# Patient Record
Sex: FEMALE | Race: WHITE | Employment: FULL TIME | URBAN - NONMETROPOLITAN AREA
[De-identification: names, ages, dates, MRNs, and addresses within clinical notes are randomized per-mention and may not be internally consistent; named-entity substitution may affect disease eponyms.]

---

## 2021-06-06 NOTE — PROGRESS NOTES
[facility] and [address] initial consultation note    [facility]                                                Chief complaint: [facility] is a 77 y. o.oldfemale came for further evaluation regarding her sleep apnea  with referral from Ms. Carlie Hale CNP    [place]:    Sleep/Wake schedule:  Usual time to go to bed during the work/regular day of week: 8:30 PM.  Usual time to wake up during the work//regular day of week: 5:30 AM.  Over the weekends her sleep schedule: [x] Remain same. She usually falls a sleep in less than: 5 minutes. She takes naps: No.       Sleep Hygiene:    Is the temperature and evironment in her bed room is acceptable to her: Yes. She watches Television in her bed room: Yes. She read books, study, pay bills etc in the bed: No.  Frequency She wake up during night/sleep: 2  Majority of nocturnal awakenings are for urination: Yes. Difficulty in falling back to sleep after nocturnal awakenings: No    Do you drink coffee: Yes. 3 cup/s per day. She drinks 3 cups of coffee in the morning  Do you drink caffeinated beverages i.e sodas: Yes. 2 can/s per day. Do you drink tea: No.      Do you drink alcoholic beverages: No.    History of recreational drug use: No.     History of tobacco smoking:No.      Sleep apnea symptoms:  She was diagnosed with obstructive sleep apnea more than 10 years back.   History of headaches in the morning:No.  History of dry mouth in the morning: No.  History of palpitations during night time/nocturnal awakenings: No.  History of sweating during night time/nocturnal awakenings: No    General:  History of head injury in the past: She fell down on her back and hit her head more than 3years back  Associated loss of consciousness with head injury: No.  History of seizures: NO.  Rest less legs syndrome symptoms:NO  History suggestive of periodic limb movements during sleep: NO  History suggestive of hypnagogic hallucinations: NO  History suggestive of hypnopompic hallucinations: NO  History suggestive of sleep talking: NO  History suggestive of sleep walking:NO  History suggestive of bruxism: NO      History suggestive of cataplexy: NO  History suggestive of sleep paralysis:NO    Family history of sleep disorders:  Family history of obstructive sleep apnea: NO.  Family history of Narcolepsy: NO.  Family history of Rest less legs syndrome : NO.      History regarding old sleep studies:  Prior history of sleep study: Yes. Please see the diagnostic data section for details-no old records were available for me to review    Using CPAP device: Yes. Currently using home Oxygen: NO.       Patient considerations:  Is the patient is ambulatory: Yes  Patient is currently using: None of these Wheelchair, [employer] or [address] [facility] Para/Quadriplegic: NO  Hearing deficit : NO  Claustrophobic: NO  MDD : NO  Blind: NO  Incontinent: NO  Para/Quadraplegi: NO.   Need transportation to and from Sleep Center:NO    Social History:  Social History     Tobacco Use    Smoking status: Never Smoker    Smokeless tobacco: Never Used   Substance Use Topics    Alcohol use: Not Currently    Drug use: Never   . She is currently working: Yes.   She is currently working at the [employer] as a   She usually goes to her work at:  7:30AM.   She completes her work at:  4:00PM.                        Past Medical History:   Diagnosis Date    Anxiety     Deep vein blood clot of left lower extremity ([address]     Diabetes mellitus [address] [address]     Hyperlipemia     Hypertension        Past Surgical History:   Procedure Laterality Date    CARPAL TUNNEL RELEASE      HYSTEROSCOPY         Allergies   Allergen Reactions    Adhesive Tape     Keflex [Cephalexin]        Current Outpatient Medications   Medication Sig Dispense Refill    Insulin Glargine (LANTUS SC) Inject into the skin 46 units bid      SITagliptin (JANUVIA) 50 MG tablet Take 50 mg by mouth daily      empagliflozin (JARDIANCE) 25 MG tablet Take 25 mg by mouth daily      atorvastatin (LIPITOR) 80 MG tablet Take 100 mg by mouth daily      triamterene-hydroCHLOROthiazide (DYAZIDE) 37.5-25 MG per capsule Take 1 capsule by mouth every morning 2 tablets daily      calcium-vitamin D (OSCAL) 250-125 MG-UNIT per tablet Take 1 tablet by mouth daily      sertraline (ZOLOFT) 50 MG tablet Take 50 mg by mouth daily      warfarin (COUMADIN) 7.5 MG tablet Take 7.5 mg by mouth 7.5 mon-fri and 5mg rest of the days       No current facility-administered medications for this visit. No family history on file. Review of Systems:    General/Constitutional: No recent loss of weight or appetite changes. No fever or chills. HENT: Negative. Eyes: Negative. Upper respiratory tract: No nasal stuffiness or post nasal drip. Lower respiratory tract/ lungs: No cough or sputum production. No hemoptysis. Cardiovascular: No palpitations or chest pain. Gastrointestinal: No nausea or vomiting. Neurological: No focal neurologiacal weakness. Extremities: No edema. Musculoskeletal: No complaints. Genitourinary: No complaints. Hematological: Negative. Psychiatric/Behavioral: Negative. Skin: No itching. /72 (Site: Left Lower Arm, Position: Sitting, Cuff Size: Medium Adult)   Pulse 94   Temp 97.9 °F (36.6 °C)   Ht 5' 4\" (1.626 m)   Wt 275 lb (124.7 kg)   SpO2 98% Comment: room air at rest  BMI 47.20 kg/m²   Mallampati airway Class:4  Neck Circumference:.19 Inches  [place] sleepiness score 7/1/21:7  ( On further questioning she gives a history of hypersomnia ( Excessive daytime sleepiness) with daytime sleepy attacks especially during her lunchtime. No reported motor vehicle accidents due to her sleepiness). Sleep Apnea Quality Of Life Questionnaire:65. Physical Exam   Nursing note and vitals reviewed. Constitutional: Patient appears moderately built and moderately nourished.  No distress. Patient is oriented to person, place, and time. HENT:   Head: Normocephalic and atraumatic. Right Ear: External ear normal.   Left Ear: External ear normal.   Mouth/Throat: Oropharynx is clear and moist.  No oral thrush. Eyes: Conjunctivae are normal. Pupils are equal, round, and reactive to light. No scleral icterus. Neck: Neck supple. No JVD present. No tracheal deviation present. Cardiovascular: Normal rate, regular rhythm, normal heart sounds. No murmur heard. Pulmonary/Chest: Effort normal and breath sounds normal. No stridor. No respiratory distress. No wheezes. No rales. Patient exhibits no tenderness. Abdominal: Soft. Patient exhibits no distension. No tenderness. Musculoskeletal: Normal range of motion. Extremities: Patient exhibits no edema and no tenderness. Lymphadenopathy:  No cervical adenopathy. Neurological: Patient is alert and oriented to person, place, and time. Skin: Skin is warm and dry. Patient is not diaphoretic. Psychiatric: Patient  has a normal mood and affect. Patient behavior is normal.     Diagnostic Data:      Sleep test:  Patient did not remember the sleep lab name. No old records were available    CPAP test:  Patient did not remember the sleep lab name. No old records were available    Assessment:  -She was diagnosed with obstructive sleep apnea more than 10 years back by a sleep facility in [place]. Patient did not remember the name of the sleep facility and no old records were available. Patient used to be on treatment with a CPAP device. Her CPAP machine became old and obsolete. It not able to give any recent download. The CPAP machine need to be replaced with a new one  -Inadequate sleep hygiene.  -Essential hypertension on treatment with medications under control  -Type 2 diabetes mellitus on treatment with medications. -Depression on treatment with medications.  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   She is on download. -She was advised to call [employer] regarding supplies if needed.  -She was advised to practice good sleep hygiene techniques. She was provided with a hand out. -She was advised call my office for earlier appointment if needed for worsening of sleep symptoms.  -Sabine New educated about my impression and plan. Patient verbalizes understanding.      -I personally reviewed updated the Past medical hx, Past surgical hx,Social hx, Family hx, Medications, Allergies in the discrete data section of the patient chart along with labs, Pulmonary medicine,Sleep medicine related, Pathological, Microbiological and Radiological investigations.

## 2021-07-01 ENCOUNTER — INITIAL CONSULT (OUTPATIENT)
Dept: PULMONOLOGY | Age: 66
End: 2021-07-01
Payer: COMMERCIAL

## 2021-07-01 VITALS
WEIGHT: 275 LBS | DIASTOLIC BLOOD PRESSURE: 72 MMHG | OXYGEN SATURATION: 98 % | SYSTOLIC BLOOD PRESSURE: 122 MMHG | HEIGHT: 64 IN | TEMPERATURE: 97.9 F | BODY MASS INDEX: 46.95 KG/M2 | HEART RATE: 94 BPM

## 2021-07-01 DIAGNOSIS — G47.30 SLEEP APNEA, UNSPECIFIED TYPE: ICD-10-CM

## 2021-07-01 DIAGNOSIS — G47.33 OSA (OBSTRUCTIVE SLEEP APNEA): Primary | ICD-10-CM

## 2021-07-01 DIAGNOSIS — I10 ESSENTIAL HYPERTENSION: ICD-10-CM

## 2021-07-01 DIAGNOSIS — F32.A DEPRESSION, UNSPECIFIED DEPRESSION TYPE: ICD-10-CM

## 2021-07-01 PROCEDURE — 99203 OFFICE O/P NEW LOW 30 MIN: CPT | Performed by: INTERNAL MEDICINE

## 2021-07-01 RX ORDER — ATORVASTATIN CALCIUM 80 MG/1
100 TABLET, FILM COATED ORAL DAILY
COMMUNITY

## 2021-07-01 RX ORDER — WARFARIN SODIUM 7.5 MG/1
7.5 TABLET ORAL
COMMUNITY

## 2021-07-01 RX ORDER — TRIAMTERENE AND HYDROCHLOROTHIAZIDE 37.5; 25 MG/1; MG/1
1 CAPSULE ORAL EVERY MORNING
COMMUNITY

## 2021-07-01 RX ORDER — EMPAGLIFLOZIN 25 MG/1
25 TABLET, FILM COATED ORAL DAILY
COMMUNITY

## 2021-07-01 NOTE — PROGRESS NOTES
Chief Complaint: Justicerobert Weinstein is a new sleep consult referred by Makayla Puckett is a new sleep consult. Mallampati airway Class:4  Neck Circumference:.19 Inches    [place] sleepiness score 7/1/21:   Sleep Apnea Quality Of Life Questionnaire:.

## 2021-07-01 NOTE — PATIENT INSTRUCTIONS
Recommendations/Plan:  - Schedule patient for nocturnal polysomnogram (Sleep study) with split night protocol at [facility]  sleep lab to diagnose sleep apnea and to get optimal pressure I.e CPAP or BiPAP to start/continue PAP therapy. Patient to follow with my clinic at [facility] sleep clinic in 6 to 8 weeks with CPAP download for further evaluation.  -I had a discussion with patient regarding avialable treatment options for her sleep disorder breathing including but not limited to CPAP titration in the sleep lab Vs.Dental appliance placement with referral to a local dentist Vs other available surgical options including Uvulopalatopharyngoplasty, maxillomandibular ostomy and tracheostomy as last option. At the end of discussion, she decided on CPAP/BiPAP/AutoSV as a treatment if she found to have obstructive sleep apnea during above test/study.  -She will be issued a new CPAP/BiPAP device after above split-night sleep study with a downloadable capacity  -She was educated to practice good sleep hygiene practices. Lexx Rush was provided with a good sleep hygiene hand out.  -Lexx Rush advised to make earlier appointment with my clinic if she develops any worsening of sleep symptoms.  -Lexx Rush advised to not to drive any motor vehicles or operate heavy equipment until her sleep symptoms are under good control. Lexx Rush verbalizes understanding.  -She was advised to loose weight by controlling diet and doing exercise. - Patient educated about my impression and plan. Patient verbalizes understanding.

## 2021-07-26 ENCOUNTER — TELEPHONE (OUTPATIENT)
Dept: PULMONOLOGY | Age: 66
End: 2021-07-26

## 2021-08-24 DIAGNOSIS — G47.30 SLEEP APNEA, UNSPECIFIED TYPE: ICD-10-CM

## 2021-08-24 DIAGNOSIS — G47.33 OSA (OBSTRUCTIVE SLEEP APNEA): ICD-10-CM

## 2021-08-24 DIAGNOSIS — F32.A DEPRESSION, UNSPECIFIED DEPRESSION TYPE: ICD-10-CM

## 2021-08-24 DIAGNOSIS — I10 ESSENTIAL HYPERTENSION: ICD-10-CM

## 2021-11-17 NOTE — PROGRESS NOTES
[facility], Sleep and [address] [facility] Follow up note    Damion Mcduffie                                             Chief complaint and [place]: Damion Mcduffie is a 74 y. o.oldfemale came for follow up regarding her sleep apnea. She is currently using her positive airway pressure device with BiPAP pressure of 18/13cm H20. She denies any problems with machine or mask. She is sleeping well at night with out difficulty. She denies any daytime sleepiness. Review of Systems:   General/Constitutional: she lost 3lbs of weight from the last visit with normal appetite. No fever or chills. HENT: Negative. Eyes: Negative. Upper respiratory tract: No nasal stuffiness or post nasal drip. Lower respiratory tract/ lungs: No cough or sputum production. No hemoptysis. Cardiovascular: No palpitations or chest pain. Gastrointestinal: No nausea or vomiting. Neurological: No focal neurologiacal weakness. Extremities: No edema. Musculoskeletal: No complaints. Genitourinary: No complaints. Hematological: Negative. Psychiatric/Behavioral: Negative. Skin: No itching.         Past Medical History:   Diagnosis Date    Anxiety     Deep vein blood clot of left lower extremity (HCC)     Diabetes mellitus ([facility] [address]     Hyperlipemia     Hypertension        Past Surgical History:   Procedure Laterality Date    CARPAL TUNNEL RELEASE      HYSTEROSCOPY         Social History     Tobacco Use    Smoking status: Never Smoker    Smokeless tobacco: Never Used   Substance Use Topics    Alcohol use: Not Currently    Drug use: Never       Allergies   Allergen Reactions    Adhesive Tape     Keflex [Cephalexin]        Current Outpatient Medications   Medication Sig Dispense Refill    Insulin Glargine (LANTUS SC) Inject into the skin 46 units bid      SITagliptin (JANUVIA) 50 MG tablet Take 50 mg by mouth daily      empagliflozin (JARDIANCE) 25 MG tablet Take 25 mg by mouth daily      atorvastatin (LIPITOR) 80 MG tablet Take 100 mg by mouth daily      triamterene-hydroCHLOROthiazide (DYAZIDE) 37.5-25 MG per capsule Take 1 capsule by mouth every morning 2 tablets daily      calcium-vitamin D (OSCAL) 250-125 MG-UNIT per tablet Take 1 tablet by mouth daily      sertraline (ZOLOFT) 50 MG tablet Take 50 mg by mouth daily      warfarin (COUMADIN) 7.5 MG tablet Take 7.5 mg by mouth 7.5 mon-fri and 5mg rest of the days       No current facility-administered medications for this visit. No family history on file. /78 (Site: Left Upper Arm, Position: Sitting, Cuff Size: Medium Adult)   Pulse 77   Temp 97.4 °F (36.3 °C)   Ht 5' 4\" (1.626 m)   Wt 272 lb 6.4 oz (123.6 kg)   SpO2 97% Comment: room air at rest  BMI 46.76 kg/m²     BMI:  Body mass index is 46.76 kg/m². Mallampati airway Class: 4  Neck Circumference:. 19  [place] sleepiness score 11/18/21: 2  Sleep apnea quality of life questionnaire. 95      Physical Exam :  Nursing note and vitals reviewed. Constitutional: Patient appears well built and well nourished. No distress. Patient is oriented to person, place, and time. HENT:   Head: Normocephalic and atraumatic. Right Ear: External ear normal.   Left Ear: External ear normal.   Mouth/Throat: Oropharynx is clear and moist.  No oral thrush. Eyes: Conjunctivae are normal. Pupils are equal, round, and reactive to light. No scleral icterus. Neck: Neck supple. No JVD present. No tracheal deviation present. Cardiovascular: Normal rate, regular rhythm, normal heart sounds. No murmur heard. Pulmonary/Chest: Effort normal and breath sounds normal. No stridor. No respiratory distress. No wheezes. No rales. Patient exhibits no tenderness. Abdominal: Soft. Patient exhibits no distension. No tenderness. Musculoskeletal: Normal range of motion. Extremities: Patient exhibits no edema and no tenderness. Lymphadenopathy:  No cervical adenopathy.    Neurological: Patient is alert and oriented to person, place, and time. Skin: Skin is warm and dry. Patient is not diaphoretic. Psychiatric: Patient  has a normal mood and affect. Patient behavior is normal.       Diagnostic Data:    Split-night sleep study performed on 20 August 2021:                      Diagnostic Data:   PAP Download:   Recorded compliance dates:10/18/21-11/16/21  More than 4hour usage compliance was:100%. Average residual Apnea- Hypoapnea index on current pressue was:5.3.       PAP Type bi-pap   Level  18/13      Average usuage hours per day was:.[id number]               Interface: nasal     Provider:  []?[facility]             [x]? [facility]                        []?[employer]          []? [facility]                           []?[facility]      []? Other:       Review of his detailed down load revealed majority of the residual respiratory events were obstructive events. Assesment:  -Severe obstructive sleep apnea on treatment with a BiPAP pressure of 18/13cm H20 - she is using her BiPAP device with excellent compliance to >4 hours of therapy. However, she was left with the residual apnea-hypopnea index of 5.3 on current BiPAP settings  Her clinical symptoms improved. She is benefiting from current BiPAP therapy and would like to continue the therapy.  -Essential hypertension on treatment with medications under control  -Type 2 diabetes mellitus on treatment with medications. -Depression on treatment with medications.  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 She is on chronic anticoagulation with Coumadin. She follows with her family physician    Recommendations/Plan:  -We will increase her current BiPAP pressure settings to 19 x 14 cm of water due to her residual apnea-hypopnea index of 5.3 on her current BiPAP settings of 18 x 13 cm of water.   Her detailed download revealed majority of the events were obstructive in nature.  -She was advised to continue current positive airway pressure therapy with above described pressure.  -She was advised to keep good compliance with current recommended pressure to get optimal results and clinical improvement.  -Follow with my clinic in 3 to 4months for clinical reevaluation with review of download. -She was advised to call [employer] regarding supplies if needed.  -She was advised to continue to practice good sleep hygiene practices.  -She was advised to loose weight by controlling diet and doing exercise.  -She was advised to call my office for earlier appointment if needed for worsening of sleep symptoms.  -Andreas So was educated about my impression and plan. Patient verbalizes understanding.      -I personally reviewed updated the Past medical hx, Past surgical hx,Social hx, Family hx, Medications, Allergies in the discrete data section of the patient chart along with labs, Pulmonary medicine,Sleep medicine related, Pathological, Microbiological and Radiological investigations.

## 2021-11-18 ENCOUNTER — OFFICE VISIT (OUTPATIENT)
Dept: PULMONOLOGY | Age: 66
End: 2021-11-18
Payer: COMMERCIAL

## 2021-11-18 VITALS
BODY MASS INDEX: 46.51 KG/M2 | WEIGHT: 272.4 LBS | TEMPERATURE: 97.4 F | HEIGHT: 64 IN | SYSTOLIC BLOOD PRESSURE: 132 MMHG | DIASTOLIC BLOOD PRESSURE: 78 MMHG | OXYGEN SATURATION: 97 % | HEART RATE: 77 BPM

## 2021-11-18 DIAGNOSIS — G47.33 OSA TREATED WITH BIPAP: ICD-10-CM

## 2021-11-18 DIAGNOSIS — I10 ESSENTIAL HYPERTENSION: Primary | ICD-10-CM

## 2021-11-18 PROCEDURE — 99214 OFFICE O/P EST MOD 30 MIN: CPT | Performed by: INTERNAL MEDICINE

## 2021-11-18 NOTE — PATIENT INSTRUCTIONS
Recommendations/Plan:  -We will increase her current BiPAP pressure settings to 19 x 14 cm of water due to her residual apnea-hypopnea index of 5.3 on her current BiPAP settings of 18 x 13 cm of water. Her detailed download revealed majority of the events were obstructive in nature.  -She was advised to continue current positive airway pressure therapy with above described pressure.  -She was advised to keep good compliance with current recommended pressure to get optimal results and clinical improvement.  -Follow with my clinic in 3 to 4months for clinical reevaluation with review of download. -She was advised to call [email or web address] regarding supplies if needed.  -She was advised to continue to practice good sleep hygiene practices.  -She was advised to loose weight by controlling diet and doing exercise.  -She was advised to call my office for earlier appointment if needed for worsening of sleep symptoms.  -Cory Fragoso was educated about my impression and plan. Patient verbalizes understanding.

## 2021-11-18 NOTE — PROGRESS NOTES
Chief Complaint: [facility] is a 8 week f/u with download     Mallampati airway Class: 4  Neck Circumference:. 19    [place] sleepiness score 11/18/21: 2  Sleep apnea quality of life questionnaire. 95      Diagnostic Data:   PAP Download:   Recorded compliance dates:10/18/21-11/16/21  More than 4hour usage compliance was:100%.   Average residual Apnea- Hypoapnea index on current pressue was:5.3.      PAP Type bi-pap   Level  18/13     Average usuage hours per day was:.[id number]     Interface: nasal    Provider:  [][facility]  [x][facility]  [][employer]  [][facility]         [][facility] []Other:

## 2022-02-17 NOTE — PROGRESS NOTES
[facility] and [address] Follow up note    Juan M Green                                             Chief complaint and [place]: Juan M Green is a 74 y. o.oldfemale came for follow up regarding her sleep apnea. She is currently using her positive airway pressure device with BiPAP pressure of 19/14cm H20. She denies any problems with machine or mask. She is currently using a nasal mask. She denies any dryness of mouth in the morning. She is sleeping well at night with out difficulty. She denies any daytime sleepiness. Review of Systems:   General/Constitutional: She denies any fever or chills. No change in weight from her last visit in 2021. HENT: Negative. Eyes: Negative. Upper respiratory tract: No nasal stuffiness or post nasal drip. Lower respiratory tract/ lungs: Occasional cough in the morning with no recent worsening of shortness of breath. Cardiovascular: No palpitations or chest pain. Gastrointestinal: No nausea or vomiting. Neurological: No focal neurologiacal weakness. Extremities: No edema. Musculoskeletal: No complaints. Genitourinary: No complaints. Hematological: Negative. Psychiatric/Behavioral: Negative. Skin: No itching.       Past Medical History:   Diagnosis Date    Anxiety     Deep vein blood clot of left lower extremity (HCC)     Diabetes mellitus ([address]     Hyperlipemia     Hypertension        Past Surgical History:   Procedure Laterality Date    CARPAL TUNNEL RELEASE      HYSTEROSCOPY         Social History     Tobacco Use    Smoking status: Never Smoker    Smokeless tobacco: Never Used   Substance Use Topics    Alcohol use: Not Currently    Drug use: Never       Allergies   Allergen Reactions    Adhesive Tape     Keflex [Cephalexin]        Current Outpatient Medications   Medication Sig Dispense Refill    Semaglutide (OZEMPIC, 1 MG/DOSE, SC) Inject into the skin      CPAP Machine MISC by Does not apply route Please change her current BiPAP pressure settings to 19 x 14 cm of water due to her residual apnea-hypopnea index of 5.3 on her current BiPAP settings of 18 x 13 cm of water. 1 each 0    Insulin Glargine (LANTUS SC) Inject into the skin 46 units bid      empagliflozin (JARDIANCE) 25 MG tablet Take 25 mg by mouth daily      atorvastatin (LIPITOR) 80 MG tablet Take 100 mg by mouth daily      triamterene-hydroCHLOROthiazide (DYAZIDE) 37.5-25 MG per capsule Take 1 capsule by mouth every morning 2 tablets daily      calcium-vitamin D (OSCAL) 250-125 MG-UNIT per tablet Take 1 tablet by mouth daily      sertraline (ZOLOFT) 50 MG tablet Take 50 mg by mouth daily      warfarin (COUMADIN) 7.5 MG tablet Take 7.5 mg by mouth 7.5 mon-fri and 5mg rest of the days       No current facility-administered medications for this visit. No family history on file. /70 (Site: Left Lower Arm, Position: Sitting, Cuff Size: Medium Adult)   Pulse 70   Temp 97.8 °F (36.6 °C)   Ht 5' 4\" (1.626 m)   Wt 272 lb 12.8 oz (123.7 kg)   SpO2 97% Comment: room air at rest  BMI 46.83 kg/m²     BMI:  Body mass index is 46.83 kg/m². Mallampati airway Class:4  Neck Circumference:.19 Inches   [place] sleepiness score 3/17/22: 1  Sleep apnea quality of life questionnaire: 80      Physical Exam :  Nursing note and vitals reviewed. Constitutional: Patient appears well built and well nourished. No distress. Patient is oriented to person, place, and time. HENT:   Head: Normocephalic and atraumatic. Right Ear: External ear normal.   Left Ear: External ear normal.   Mouth/Throat: Oropharynx is clear and moist.  No oral thrush. Eyes: Conjunctivae are normal. Pupils are equal, round, and reactive to light. No scleral icterus. Neck: Neck supple. No JVD present. No tracheal deviation present. Cardiovascular: Normal rate, regular rhythm, normal heart sounds. No murmur heard.    Pulmonary/Chest: Effort normal and breath sounds normal. No stridor. No respiratory distress. No wheezes. No rales. Patient exhibits no tenderness. Abdominal: Soft. Patient exhibits no distension. No tenderness. Musculoskeletal: Normal range of motion. Extremities: Patient exhibits no edema and no tenderness in her right lower extremity. She was noted to have a trace leg edema in her left leg with chronic hyperpigmentation. Lymphadenopathy:  No cervical adenopathy. Neurological: Patient is alert and oriented to person, place, and time. Skin: Skin is warm and dry. Patient is not diaphoretic. Psychiatric: Patient  has a normal mood and affect. Patient behavior is normal.       Diagnostic Data:    Split-night sleep study performed on 20 August 2021:  Reviewed. Diagnostic Data:   PAP Download:   Recorded compliance dates:2/13/22-3/14/22  More than 4hour usage compliance was:100%. Average residual Apnea- Hypoapnea index on current pressue was:3.3.       PAP Type bi-pap   Level  19/14      Average usuage hours per day was:.[id number]               Interface: nasal     Provider:  []?[facility]             [x]? [facility]                        []?[facility]          []? [facility]                           []?[facility]      []? Other:     95th percentile leaks: 44.4 L/min       Assesment:  -Severe obstructive sleep apnea on treatment with a BiPAP pressure of 19/14cm H20 - she is using her BiPAP device with excellent compliance to >4 hours of therapy. Her residual apnea hypopnea's is under control. However, she was left with a significant leaks around her current mask. Her clinical symptoms improved. She is benefiting from current BiPAP therapy and would like to continue the therapy.  -Essential hypertension on treatment with medications under control  -Type 2 diabetes mellitus on treatment with medications. -Depression on treatment with medications.  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 She is on chronic anticoagulation with Coumadin. She follows with her family physician    Recommendations/Plan:  -She was educated and advised to apply her current mask properly and tight enough to minimize leaks.  -Patient will be given a prescription for chin strap to use with her current mask to avoid leaks and to improve her dryness of mouth.  -She was advised to continue current positive airway pressure therapy with above described pressure.  -She was advised to keep good compliance with current recommended pressure to get optimal results and clinical improvement.  -Follow with my clinic in 12months for clinical reevaluation with review of download. -She was advised to call [employer] regarding supplies if needed.  -She was advised to continue to practice good sleep hygiene practices.  -She was advised to loose weight by controlling diet and doing exercise.  -She was advised to call my office for earlier appointment if needed for worsening of sleep symptoms.  -Akin Farias was educated about my impression and plan. Patient verbalizes understanding.      -I personally reviewed updated the Past medical hx, Past surgical hx,Social hx, Family hx, Medications, Allergies in the discrete data section of the patient chart along with labs, Pulmonary medicine,Sleep medicine related, Pathological, Microbiological and Radiological investigations.

## 2022-03-17 ENCOUNTER — OFFICE VISIT (OUTPATIENT)
Dept: PULMONOLOGY | Age: 67
End: 2022-03-17
Payer: COMMERCIAL

## 2022-03-17 VITALS
SYSTOLIC BLOOD PRESSURE: 124 MMHG | BODY MASS INDEX: 46.57 KG/M2 | TEMPERATURE: 97.8 F | OXYGEN SATURATION: 97 % | HEART RATE: 70 BPM | DIASTOLIC BLOOD PRESSURE: 70 MMHG | WEIGHT: 272.8 LBS | HEIGHT: 64 IN

## 2022-03-17 DIAGNOSIS — G47.33 OSA TREATED WITH BIPAP: Primary | ICD-10-CM

## 2022-03-17 PROCEDURE — 99214 OFFICE O/P EST MOD 30 MIN: CPT | Performed by: INTERNAL MEDICINE

## 2022-03-17 NOTE — PROGRESS NOTES
Chief Complaint: Too 4 month f/u with download    Mallampati airway Class:4  Neck Circumference:.19 Inches    [place] sleepiness score 3/17/22:   Sleep apnea quality of life questionnaire:.      Diagnostic Data:   PAP Download:   Recorded compliance dates:2/13/22-3/14/22  More than 4hour usage compliance was:100%. Average residual Apnea- Hypoapnea index on current pressue was:3.3.       PAP Type bi-pap   Level  19/14     Average usuage hours per day was:.[id number]     Interface: nasal    Provider:  [][facility]  [x][facility]  [][facility]  [][facility]         [][facility] []Other:

## 2022-03-17 NOTE — PATIENT INSTRUCTIONS
Recommendations/Plan:  -She was educated and advised to apply her current mask properly and tight enough to minimize leaks.  -Patient will be given a prescription for chin strap to use with her current mask to avoid leaks and to improve her dryness of mouth.  -She was advised to continue current positive airway pressure therapy with above described pressure.  -She was advised to keep good compliance with current recommended pressure to get optimal results and clinical improvement.  -Follow with my clinic in 12months for clinical reevaluation with review of download. -She was advised to call [employer] regarding supplies if needed.  -She was advised to continue to practice good sleep hygiene practices.  -She was advised to loose weight by controlling diet and doing exercise.  -She was advised to call my office for earlier appointment if needed for worsening of sleep symptoms.  -Ruma Ornelas was educated about my impression and plan. Patient verbalizes understanding.

## 2023-02-17 NOTE — PROGRESS NOTES
[facility] and [address] [facility] Follow up note    Yady Stoner                                             Chief complaint and [place]: Yady Stoner is a 69 y.o.oldfemale came for follow up regarding her sleep apnea. She is currently using her positive airway pressure device with BiPAP pressure of 19/14cm H20. She denies any problems with machine or mask. She is currently using a nasal mask. She was prescribed with a chin strap at the last visit due to significant leaks around her nasal mask. However, she is not using her nasal mask due to discomfort with chin strap. She did not want to use full face mask. She denies any dryness of mouth in the morning. She is sleeping well at night with out difficulty. She denies any daytime sleepiness. She is currently working at [employer] during daytime. Review of Systems:   General/Constitutional: she lost 27lbs of weight from the last visit. No fever or chills. HENT: Negative. Eyes: Negative. Upper respiratory tract: No nasal stuffiness or post nasal drip. Lower respiratory tract/ lungs: No cough or sputum production. Cardiovascular: No palpitations or chest pain. Gastrointestinal: No nausea or vomiting. Neurological: No focal neurologiacal weakness. Extremities: No edema. Musculoskeletal: No complaints. Genitourinary: No complaints. Hematological: Negative. Psychiatric/Behavioral: Negative. Skin: No itching.       Past Medical History:   Diagnosis Date    Anxiety     Deep vein blood clot of left lower extremity (HCC)     Diabetes mellitus ([address]     Hyperlipemia     Hypertension        Past Surgical History:   Procedure Laterality Date    CARPAL TUNNEL RELEASE      CATARACT REMOVAL      HYSTEROSCOPY         Social History     Tobacco Use    Smoking status: Never    Smokeless tobacco: Never   Substance Use Topics    Alcohol use: Not Currently    Drug use: Never       Allergies   Allergen Reactions    Adhesive Tape     Keflex [Cephalexin]        Current Outpatient Medications   Medication Sig Dispense Refill    Semaglutide (OZEMPIC, 1 MG/DOSE, SC) Inject into the skin      CPAP Machine MISC by Does not apply route Please change her current BiPAP pressure settings to 19 x 14 cm of water due to her residual apnea-hypopnea index of 5.3 on her current BiPAP settings of 18 x 13 cm of water. 1 each 0    Insulin Glargine (LANTUS SC) Inject into the skin 46 units bid      empagliflozin (JARDIANCE) 25 MG tablet Take 25 mg by mouth daily      atorvastatin (LIPITOR) 80 MG tablet Take 100 mg by mouth daily      triamterene-hydroCHLOROthiazide (DYAZIDE) 37.5-25 MG per capsule Take 1 capsule by mouth every morning 2 tablets daily      calcium-vitamin D (OSCAL) 250-125 MG-UNIT per tablet Take 1 tablet by mouth daily      sertraline (ZOLOFT) 50 MG tablet Take 50 mg by mouth daily      warfarin (COUMADIN) 7.5 MG tablet Take 7.5 mg by mouth 7.5 mon-fri and 5mg rest of the days       No current facility-administered medications for this visit. No family history on file. /78 (Site: Left Upper Arm, Position: Sitting, Cuff Size: Large Adult)   Pulse 71   Temp 98.8 °F (37.1 °C)   Ht 5' 4\" (1.626 m)   Wt 245 lb (111.1 kg)   SpO2 98% Comment: room air at rest  BMI 42.05 kg/m²     BMI:  Body mass index is 42.05 kg/m². Mallampati airway Class:1  Neck Circumference:. 19 Inches  [place] sleepiness score 3/16/23: .2  Sleep apnea quality of life questionnaire:86    Physical Exam :  Nursing note and vitals reviewed. Constitutional: Patient appears well built and well nourished. No distress. Patient is oriented to person, place, and time. HENT:   Head: Normocephalic and atraumatic. Right Ear: External ear normal.   Left Ear: External ear normal.   Mouth/Throat: Oropharynx is clear and moist.  No oral thrush. Eyes: Conjunctivae are normal. Pupils are equal, round, and reactive to light.  No scleral icterus. Neck: Neck supple. No JVD present. No tracheal deviation present. Cardiovascular: Normal rate, regular rhythm, normal heart sounds. No murmur heard. Pulmonary/Chest: Effort normal and breath sounds normal. No stridor. No respiratory distress. No wheezes. No rales. Patient exhibits no tenderness. Abdominal: Soft. Patient exhibits no distension. No tenderness. Musculoskeletal: Normal range of motion. Extremities: Patient exhibits no edema and no tenderness in her right lower extremity. She was noted to have a trace leg edema in her left leg with chronic hyperpigmentation. Lymphadenopathy:  No cervical adenopathy. Neurological: Patient is alert and oriented to person, place, and time. Skin: Skin is warm and dry. Patient is not diaphoretic. Psychiatric: Patient  has a normal mood and affect. Patient behavior is normal.       Diagnostic Data:    Split-night sleep study performed on 20 August 2021:  Reviewed. Diagnostic Data:   PAP Download:   Recorded compliance dates:2/12/23-3/13/23  More than 4hour usage compliance was:100%. Average residual Apnea- Hypoapnea index on current pressue was:1.9. PAP Type bi-pap   Level  19/14      Average usuage hours per day was:.[id number]               Interface: nasal     Provider:  []-KESHAV             [x]Tianna                        []Marilee          [][facility]                           [][facility]      []Other:        95th percentile leaks: 30.3L/min- Improved from last visit. Assesment:  -Severe obstructive sleep apnea on treatment with a BiPAP pressure of 19/14cm H20 - she is using her BiPAP device with excellent compliance to >4 hours of therapy. Her residual apnea hypopnea's is under control. However, she was still left with a significant leaks around her current mask. She is not using chin strap as prescribed in the past. Her clinical symptoms improved.  She is benefiting from current BiPAP therapy and would like to continue the therapy.  -Essential hypertension on treatment with medications under control  -Type 2 diabetes mellitus on treatment with medications. -Depression on treatment with medications.  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 She is on chronic anticoagulation with Coumadin. She follows with her family physician    Recommendations/Plan:  -Due to significant loss of weight I.e 30 pounds of weight from her last split night sleep study performed in 2021, she was offered to go for repeat baseline sleep study to check the current status of sleep apnea. However, patient did not want to go for repeat baseline sleep study at this time. She would like to continue her BiPAP therapy. She feels secure with the BiPAP therapy.  -She was educated and advised to apply her current mask properly and tight enough to minimize leaks.  -She was advised to use chin strap with her current mask to avoid leaks around her current mask.  -She was advised to continue current positive airway pressure therapy with above described pressure.  -She was advised to keep good compliance with current recommended pressure to get optimal results and clinical improvement.  -She was advised to call [employer] regarding supplies if needed.  -She was advised to continue to practice good sleep hygiene practices.  -She was advised to loose weight by controlling diet and doing exercise.  -Follow with my clinic in 12months for clinical reevaluation with review of download. -She was advised to call my office for earlier appointment if needed for worsening of sleep symptoms.  -Soham Chauhan was educated about my impression and plan.  Patient verbalizes understanding.      -I personally reviewed updated the Past medical hx, Past surgical hx,Social hx, Family hx, Medications, Allergies in the discrete data section of the patient chart along with labs, Pulmonary medicine,Sleep medicine related, Pathological, Microbiological and Radiological investigations.

## 2023-03-16 ENCOUNTER — OFFICE VISIT (OUTPATIENT)
Dept: PULMONOLOGY | Age: 68
End: 2023-03-16
Payer: COMMERCIAL

## 2023-03-16 VITALS
HEIGHT: 64 IN | SYSTOLIC BLOOD PRESSURE: 128 MMHG | BODY MASS INDEX: 41.83 KG/M2 | TEMPERATURE: 98.8 F | DIASTOLIC BLOOD PRESSURE: 78 MMHG | HEART RATE: 71 BPM | WEIGHT: 245 LBS | OXYGEN SATURATION: 98 %

## 2023-03-16 DIAGNOSIS — G47.33 OSA TREATED WITH BIPAP: Primary | ICD-10-CM

## 2023-03-16 PROCEDURE — 1123F ACP DISCUSS/DSCN MKR DOCD: CPT | Performed by: INTERNAL MEDICINE

## 2023-03-16 PROCEDURE — 99214 OFFICE O/P EST MOD 30 MIN: CPT | Performed by: INTERNAL MEDICINE

## 2023-03-16 NOTE — PATIENT INSTRUCTIONS
Recommendations/Plan:  -Due to significant loss of weight I.e 30 pounds of weight from her last split night sleep study performed in 2021, she was offered to go for repeat baseline sleep study to check the current status of sleep apnea. However, patient did not want to go for repeat baseline sleep study at this time. She would like to continue her BiPAP therapy. She feels secure with the BiPAP therapy.  -She was educated and advised to apply her current mask properly and tight enough to minimize leaks.  -She was advised to use chin strap with her current mask to avoid leaks around her current mask.  -She was advised to continue current positive airway pressure therapy with above described pressure.  -She was advised to keep good compliance with current recommended pressure to get optimal results and clinical improvement.  -She was advised to call [employer] regarding supplies if needed.  -She was advised to continue to practice good sleep hygiene practices.  -She was advised to loose weight by controlling diet and doing exercise.  -Follow with my clinic in 12months for clinical reevaluation with review of download. -She was advised to call my office for earlier appointment if needed for worsening of sleep symptoms.  -Marshall Schulz was educated about my impression and plan. Patient verbalizes understanding.

## 2023-03-16 NOTE — PROGRESS NOTES
Chief Complaint: Lisa Nickerson is here for 1 year f/u with download     Mallampati airway Class:1  Neck Circumference:. 19 Inches    [place] sleepiness score 3/16/23: .2  Sleep apnea quality of life questionnaire:86      Diagnostic Data:   PAP Download:   Recorded compliance dates:2/12/23-3/13/23  More than 4hour usage compliance was:100%. Average residual Apnea- Hypoapnea index on current pressue was:1.9.       PAP Type bi-pap   Level  19/14     Average usuage hours per day was:.[id number]     Interface: nasal    Provider:  [][facility]  [x][facility]  [][facility]  [][facility]         [][facility] []Other:

## 2024-03-14 ENCOUNTER — OFFICE VISIT (OUTPATIENT)
Dept: PULMONOLOGY | Age: 69
End: 2024-03-14
Payer: COMMERCIAL

## 2024-03-14 VITALS
DIASTOLIC BLOOD PRESSURE: 68 MMHG | SYSTOLIC BLOOD PRESSURE: 128 MMHG | OXYGEN SATURATION: 98 % | WEIGHT: 233 LBS | TEMPERATURE: 98.3 F | BODY MASS INDEX: 39.78 KG/M2 | HEIGHT: 64 IN | HEART RATE: 77 BPM

## 2024-03-14 DIAGNOSIS — G47.33 OSA TREATED WITH BIPAP: Primary | ICD-10-CM

## 2024-03-14 PROCEDURE — 99214 OFFICE O/P EST MOD 30 MIN: CPT | Performed by: INTERNAL MEDICINE

## 2024-03-14 PROCEDURE — 1123F ACP DISCUSS/DSCN MKR DOCD: CPT | Performed by: INTERNAL MEDICINE

## 2024-03-14 NOTE — PATIENT INSTRUCTIONS
Recommendations/Plan:  -She was advised to continue current positive airway pressure therapy with above described pressure. She refused to for any decrease in her current BiPAP pressure settings. She want to continue current BiPAP pressure.  -She was advised to keep good compliance with current recommended pressure to get optimal results and clinical improvement.  -She was advised to call [employer] regarding supplies if needed.  -She was advised to continue to practice good sleep hygiene practices.  -She was advised to loose weight by controlling diet and doing exercise.  -Follow with my clinic in 12months for clinical reevaluation with review of download.  -She was advised to call my office for earlier appointment if needed for worsening of sleep symptoms.  -Eden Aquino was educated about my impression and plan. Patient verbalizes understanding.

## 2024-03-14 NOTE — PROGRESS NOTES
Chief Complaint: 1 year f/u with download [place]    Mallampati airway Class:1  Neck Circumference:. 19Inches    [place] sleepiness score 3/14/24: 3  Sleep apnea quality of life questionnaire:.92      Diagnostic Data:   PAP Download:   Recorded compliance dates:2/12/24-3/12/24  More than 4hour usage compliance was:100%.  Average residual Apnea- Hypoapnea index on current pressue was:0.5.      PAP Type auto   Level  19/14     Average usuage hours per day was:.8 hours 27 minutes       Interface: nasal    Provider:  [][facility]  [x][facility]  [][facility]  [][facility]         [][facility] []Other:     
Inject into the skin      CPAP Machine MISC by Does not apply route Please change her current BiPAP pressure settings to 19 x 14 cm of water due to her residual apnea-hypopnea index of 5.3 on her current BiPAP settings of 18 x 13 cm of water. 1 each 0    Insulin Glargine (LANTUS SC) Inject into the skin 46 units bid      empagliflozin (JARDIANCE) 25 MG tablet Take 1 tablet by mouth daily      atorvastatin (LIPITOR) 80 MG tablet Take 100 mg by mouth daily      triamterene-hydroCHLOROthiazide (DYAZIDE) 37.5-25 MG per capsule Take 1 capsule by mouth every morning 2 tablets daily      calcium-vitamin D (OSCAL) 250-125 MG-UNIT per tablet Take 1 tablet by mouth daily      sertraline (ZOLOFT) 50 MG tablet Take 1 tablet by mouth daily      warfarin (COUMADIN) 7.5 MG tablet Take 1 tablet by mouth 7.5 mon-fri and 5mg rest of the days       No current facility-administered medications for this visit.       No family history on file.       /68 (Site: Left Upper Arm, Position: Sitting, Cuff Size: Large Adult)   Pulse 77   Temp 98.3 °F (36.8 °C)   Ht 1.626 m (5' 4\")   Wt 105.7 kg (233 lb)   SpO2 98% Comment: room air at rest  BMI 39.99 kg/m²   BMI:  Body mass index is 39.99 kg/m².     Mallampati airway Class:1  Neck Circumference: 19Inches  [place] sleepiness score 3/14/24: 3  Sleep apnea quality of life questionnaire: 92    Physical Exam :  Nursing note and vitals reviewed.  Constitutional: Patient appears well built and well nourished. No distress. Patient is oriented to person, place, and time.  HENT:   Head: Normocephalic and atraumatic.   Right Ear: External ear normal.   Left Ear: External ear normal.   Mouth/Throat: Oropharynx is clear and moist.  No oral thrush.  Eyes: Conjunctivae are normal. Pupils are equal, round, and reactive to light. No scleral icterus.   Neck: Neck supple. No JVD present. No tracheal deviation present.   Cardiovascular: Normal rate, regular rhythm, normal heart sounds. No murmur heard.

## 2025-02-14 NOTE — PROGRESS NOTES
[facility]  Sleep Medicine Clinic Follow up note    Eden Aquino                                             Chief complaint and [place]: Eden Aquino is a 69 y.o.oldfemale came for follow up regarding her sleep apnea. She is currently using her positive airway pressure device with BiPAP pressure of 19/14cm H20. She denies any problems with machine or mask.  She is currently using a nasal mask with out chin strap. She is sleeping well at night with out difficulty. She denies any daytime sleepiness.     She is currently on treatment with [facility] for her hyper admits mellitus.  She last 78 pounds of weight since her initial sleep study performed in 2021.    She is currently working at [employer] during daytime.    Review of Systems:   General/Constitutional: she lost 36lbs of weight from the last visit. No fever or chills. ( She used to weight 275lbs of weight on the day of her split night sleep study performed in 2021. She lost 78lbs of weight since her initial split night sleep study in 2021)  HENT: Negative.   Eyes: Negative.  Upper respiratory tract: No nasal stuffiness or post nasal drip.  Lower respiratory tract/ lungs: No cough or sputum production.  Cardiovascular: No palpitations or chest pain.  Gastrointestinal: No nausea or vomiting.  Neurological: No focal neurologiacal weakness.  Extremities: No edema.  Musculoskeletal: No complaints.  Genitourinary: No complaints.  Hematological: Negative.   Psychiatric/Behavioral: Negative.   Skin: No itching.      Past Medical History:   Diagnosis Date    Anxiety     Deep vein blood clot of left lower extremity (HCC)     Diabetes mellitus (HCC)     Hyperlipemia     Hypertension        Past Surgical History:   Procedure Laterality Date    CARPAL TUNNEL RELEASE      CATARACT REMOVAL      HYSTEROSCOPY         Social History     Tobacco Use    Smoking status: Never    Smokeless tobacco: Never   Vaping Use

## 2025-03-13 ENCOUNTER — OFFICE VISIT (OUTPATIENT)
Dept: PULMONOLOGY | Age: 70
End: 2025-03-13
Payer: COMMERCIAL

## 2025-03-13 VITALS
DIASTOLIC BLOOD PRESSURE: 80 MMHG | HEIGHT: 64 IN | WEIGHT: 197.8 LBS | TEMPERATURE: 98 F | BODY MASS INDEX: 33.77 KG/M2 | HEART RATE: 74 BPM | OXYGEN SATURATION: 97 % | SYSTOLIC BLOOD PRESSURE: 132 MMHG

## 2025-03-13 DIAGNOSIS — F32.A DEPRESSION, UNSPECIFIED DEPRESSION TYPE: ICD-10-CM

## 2025-03-13 DIAGNOSIS — I10 ESSENTIAL HYPERTENSION: ICD-10-CM

## 2025-03-13 DIAGNOSIS — G47.33 OSA TREATED WITH BIPAP: Primary | ICD-10-CM

## 2025-03-13 PROCEDURE — 3079F DIAST BP 80-89 MM HG: CPT | Performed by: INTERNAL MEDICINE

## 2025-03-13 PROCEDURE — 99214 OFFICE O/P EST MOD 30 MIN: CPT | Performed by: INTERNAL MEDICINE

## 2025-03-13 PROCEDURE — 1123F ACP DISCUSS/DSCN MKR DOCD: CPT | Performed by: INTERNAL MEDICINE

## 2025-03-13 PROCEDURE — 3075F SYST BP GE 130 - 139MM HG: CPT | Performed by: INTERNAL MEDICINE

## 2025-03-13 RX ORDER — LOSARTAN POTASSIUM 100 MG/1
100 TABLET ORAL DAILY
COMMUNITY
Start: 2025-03-10

## 2025-03-13 RX ORDER — TIRZEPATIDE 12.5 MG/.5ML
12.5 INJECTION, SOLUTION SUBCUTANEOUS WEEKLY
COMMUNITY
Start: 2025-01-23

## 2025-03-13 NOTE — PROGRESS NOTES
Chief Complaint: 1 year ARCELIA follow up with [employer] download.     Mallampati airway Class: 1  Neck Circumference: 15.5 inches    Quinton sleepiness score 3/13/25: 2  SAQLI: 97      Diagnostic Data:   PAP Download:   Recorded compliance dates: 2/9/25-3/10/25  More than 4hour usage compliance was: 100%  Average residual Apnea- Hypoapnea index on current pressue was: 0.2      PAP Type Spont   Level  IPAP [id number] EPAP [id number]     Average usuage hours per day was: 8 hours 16 minutes     Interface: FFM    Provider:  []PATRICIA  [x]Tianna  []Marilee  []Rogers         [][facility]

## 2025-03-13 NOTE — PATIENT INSTRUCTIONS
Recommendations/Plan:  -Due to loss of 78 pounds of weight since her initial split-night sleep study performed in 2021, will schedule patient for baseline sleep study to check the current status of sleep apnea.  She would like to stop her BiPAP device therapy if she do not have sleep apnea on her baseline sleep study.  -Schedule patient for nocturnal polysomnogram (Sleep study) in the sleep lab. Patient educated to not to drive any motor vehicles or operate heavy equipment until sleep symptoms are under control. Patient verbalizes understanding. Patient to follow with my sleep [id number] after sleep study.  -She was advised to continue current positive airway pressure therapy with above described pressure. She refused to for any decrease in her current BiPAP pressure settings. She want to continue current BiPAP pressure.  -She was advised to keep good compliance with current recommended pressure to get optimal results and clinical improvement.  -She was advised to call [employer] regarding supplies if needed.  -She was advised to continue to practice good sleep hygiene practices.  -She was advised to continue to loose weight by controlling diet and doing exercise.  -She was advised to call my office for earlier appointment if needed for worsening of sleep symptoms.  -Eden Aquino was educated about my impression and plan. Patient verbalizes understanding.

## 2025-04-23 ENCOUNTER — TELEPHONE (OUTPATIENT)
Dept: PULMONOLOGY | Age: 70
End: 2025-04-23

## 2025-04-23 NOTE — TELEPHONE ENCOUNTER
JEANNIE DUMONT spoke with Clarissa at [facility] and she has tried to call the patient 5 times to scheduled her sleep study with no return call